# Patient Record
Sex: MALE | Race: WHITE | NOT HISPANIC OR LATINO | URBAN - METROPOLITAN AREA
[De-identification: names, ages, dates, MRNs, and addresses within clinical notes are randomized per-mention and may not be internally consistent; named-entity substitution may affect disease eponyms.]

---

## 2021-05-28 ENCOUNTER — OFFICE VISIT (OUTPATIENT)
Dept: URBAN - METROPOLITAN AREA CLINIC 78 | Facility: CLINIC | Age: 66
End: 2021-05-28
Payer: MEDICARE

## 2021-05-28 ENCOUNTER — LAB OUTSIDE AN ENCOUNTER (OUTPATIENT)
Dept: URBAN - METROPOLITAN AREA CLINIC 78 | Facility: CLINIC | Age: 66
End: 2021-05-28

## 2021-05-28 VITALS
BODY MASS INDEX: 23.13 KG/M2 | HEIGHT: 69 IN | DIASTOLIC BLOOD PRESSURE: 78 MMHG | WEIGHT: 156.2 LBS | SYSTOLIC BLOOD PRESSURE: 163 MMHG | TEMPERATURE: 98 F | HEART RATE: 80 BPM

## 2021-05-28 DIAGNOSIS — R13.10 ESOPHAGEAL DYSPHAGIA: ICD-10-CM

## 2021-05-28 DIAGNOSIS — R11.0 NAUSEA: ICD-10-CM

## 2021-05-28 DIAGNOSIS — R10.84 GENERALIZED ABDOMINAL PAIN: ICD-10-CM

## 2021-05-28 DIAGNOSIS — R63.4 WEIGHT LOSS: ICD-10-CM

## 2021-05-28 PROCEDURE — G9903 PT SCRN TBCO ID AS NON USER: HCPCS | Performed by: INTERNAL MEDICINE

## 2021-05-28 PROCEDURE — 3017F COLORECTAL CA SCREEN DOC REV: CPT | Performed by: INTERNAL MEDICINE

## 2021-05-28 PROCEDURE — G9622 NO UNHEAL ETOH USER: HCPCS | Performed by: INTERNAL MEDICINE

## 2021-05-28 PROCEDURE — 99214 OFFICE O/P EST MOD 30 MIN: CPT | Performed by: INTERNAL MEDICINE

## 2021-05-28 PROCEDURE — G8427 DOCREV CUR MEDS BY ELIG CLIN: HCPCS | Performed by: INTERNAL MEDICINE

## 2021-05-28 PROCEDURE — G8420 CALC BMI NORM PARAMETERS: HCPCS | Performed by: INTERNAL MEDICINE

## 2021-05-28 PROCEDURE — 1036F TOBACCO NON-USER: CPT | Performed by: INTERNAL MEDICINE

## 2021-05-28 RX ORDER — OMEPRAZOLE 20 MG/1
TABLET, DELAYED RELEASE ORAL
Qty: 0 | Refills: 0 | Status: ACTIVE | COMMUNITY
Start: 1900-01-01

## 2021-05-28 RX ORDER — METHOCARBAMOL 500 MG/1
1.5 TABLETS TABLET ORAL
Status: ACTIVE | COMMUNITY

## 2021-05-28 RX ORDER — ALLOPURINOL 100 MG/1
TAKE 1 TABLET (100 MG) BY ORAL ROUTE ONCE DAILY TABLET ORAL 1
Qty: 0 | Refills: 0 | Status: ACTIVE | COMMUNITY
Start: 1900-01-01

## 2021-05-28 RX ORDER — LOVASTATIN 10 MG/1
TABLET ORAL
Qty: 0 | Refills: 0 | Status: ON HOLD | COMMUNITY
Start: 1900-01-01

## 2021-05-28 RX ORDER — SUCRALFATE 1 G/1
1 TABLET ON AN EMPTY STOMACH TABLET ORAL TWICE A DAY
Status: ACTIVE | COMMUNITY

## 2021-05-28 RX ORDER — NAPROXEN 500 MG/1
TABLET ORAL
Qty: 0 | Refills: 0 | Status: ACTIVE | COMMUNITY
Start: 1900-01-01

## 2021-05-28 RX ORDER — FLUOCINOLONE ACETONIDE 0.11 MG/ML
AS DIRECTED OIL AURICULAR (OTIC) AS NEEDED
Status: ACTIVE | COMMUNITY

## 2021-05-28 RX ORDER — IPRATROPIUM BROMIDE 21 UG/1
2 SPRAYS IN EACH NOSTRIL SPRAY NASAL TWICE A DAY
Status: ACTIVE | COMMUNITY

## 2021-05-28 NOTE — PHYSICAL EXAM HENT:
Head- normocephalic, atraumatic, Face- Face within normal limits, Ears- External ears within normal limits, Nose/Nasopharynx- External nose normal appearance, nares patent, no nasal discharge, Mouth and Throat- Oral cavity appearance normal, Lips- Appearance normal

## 2021-05-28 NOTE — PREVIOUS WORKUP REVIEWED
.  ENDOSCOPIES -Colonoscopy 3/18/2019: Normal.  Repeat colonoscopy in 10 years.  LABS  -Labs 4/29/2021:WBC 7.2, hemoglobin 15.3, platelet 178, UA negative for blood, creatinine 0.9, BUN 18, total bilirubin 0.4, alkaline phosphatase 62, AST 23, ALT 35, lipase 50.  IMAGES  -CT abdomen pelvis with contrast 4/29/2021:Diffuse fatty liver.  Small calcified stones within the gallbladder.  Normal bile ducts.  Normal pancreas.  Negative for bowel obstruction.  A few sigmoid diverticulosis.  No findings of diverticulitis.  Normal TI and appendix.  Status post hernia repair we did the left lower abdomen and left pelvis.  Small fatty left inguinal hernia.  No evidence of straining.

## 2021-05-28 NOTE — HPI-TODAY'S VISIT:
This 60-year-old  male presents for chronic left-sided abdominal/flank/back pain.  He went to ER on April 29, [facility] for acute worsening pain in the left lower quadrant area.  Labs and CT scans were done unremarkable.  Sucralfate was prescribed, helped. He reports he had significant constipation before the hospitalist visit.  His bowel movements are regular, 1-2 bowel most per day.  [place] Stool Scale 3, 5, 6. Colonoscopy 2019 normal. Feeling nausea constantly, 10 pounds of weight loss.  He has diabetes, GERD on omeprazole. No previous EGD. He also reports frequent gurgling sound from his belly. He has significant musculoskeletal disorders, spine problems including herniated disks, gout.  Takes naproxen daily. Denies melena, bloody stool

## 2021-06-14 PROBLEM — 40890009: Status: ACTIVE | Noted: 2021-06-14

## 2021-06-16 ENCOUNTER — OFFICE VISIT (OUTPATIENT)
Dept: URBAN - METROPOLITAN AREA CLINIC 19 | Facility: CLINIC | Age: 66
End: 2021-06-16

## 2021-06-21 ENCOUNTER — TELEPHONE ENCOUNTER (OUTPATIENT)
Dept: URBAN - METROPOLITAN AREA CLINIC 78 | Facility: CLINIC | Age: 66
End: 2021-06-21

## 2021-06-25 ENCOUNTER — TELEPHONE ENCOUNTER (OUTPATIENT)
Dept: URBAN - METROPOLITAN AREA CLINIC 77 | Facility: CLINIC | Age: 66
End: 2021-06-25

## 2021-06-25 ENCOUNTER — OFFICE VISIT (OUTPATIENT)
Dept: URBAN - METROPOLITAN AREA MEDICAL CENTER 27 | Facility: MEDICAL CENTER | Age: 66
End: 2021-06-25
Payer: MEDICARE

## 2021-06-25 ENCOUNTER — TELEPHONE ENCOUNTER (OUTPATIENT)
Dept: URBAN - METROPOLITAN AREA CLINIC 78 | Facility: CLINIC | Age: 66
End: 2021-06-25

## 2021-06-25 DIAGNOSIS — K31.5 DUODENAL STENOSIS: ICD-10-CM

## 2021-06-25 DIAGNOSIS — K22.8 COLUMNAR-LINED ESOPHAGUS: ICD-10-CM

## 2021-06-25 DIAGNOSIS — K31.89 ACQUIRED DEFORMITY OF DUODENUM: ICD-10-CM

## 2021-06-25 DIAGNOSIS — K22.4 ESOPHAGEAL MOTILITY DISORDER: ICD-10-CM

## 2021-06-25 PROCEDURE — 43239 EGD BIOPSY SINGLE/MULTIPLE: CPT | Performed by: INTERNAL MEDICINE

## 2021-06-25 RX ORDER — LOVASTATIN 10 MG/1
TABLET ORAL
Qty: 0 | Refills: 0 | Status: ON HOLD | COMMUNITY
Start: 1900-01-01

## 2021-06-25 RX ORDER — OMEPRAZOLE 20 MG/1
TABLET, DELAYED RELEASE ORAL
Qty: 0 | Refills: 0 | Status: ACTIVE | COMMUNITY
Start: 1900-01-01

## 2021-06-25 RX ORDER — ALLOPURINOL 100 MG/1
TAKE 1 TABLET (100 MG) BY ORAL ROUTE ONCE DAILY TABLET ORAL 1
Qty: 0 | Refills: 0 | Status: ACTIVE | COMMUNITY
Start: 1900-01-01

## 2021-06-25 RX ORDER — SUCRALFATE 1 G/1
1 TABLET ON AN EMPTY STOMACH TABLET ORAL TWICE A DAY
Status: ACTIVE | COMMUNITY

## 2021-06-25 RX ORDER — IPRATROPIUM BROMIDE 21 UG/1
2 SPRAYS IN EACH NOSTRIL SPRAY NASAL TWICE A DAY
Status: ACTIVE | COMMUNITY

## 2021-06-25 RX ORDER — NAPROXEN 500 MG/1
TABLET ORAL
Qty: 0 | Refills: 0 | Status: ACTIVE | COMMUNITY
Start: 1900-01-01

## 2021-06-25 RX ORDER — FLUOCINOLONE ACETONIDE 0.11 MG/ML
AS DIRECTED OIL AURICULAR (OTIC) AS NEEDED
Status: ACTIVE | COMMUNITY

## 2021-06-25 RX ORDER — METHOCARBAMOL 500 MG/1
1.5 TABLETS TABLET ORAL
Status: ACTIVE | COMMUNITY

## 2021-06-28 ENCOUNTER — TELEPHONE ENCOUNTER (OUTPATIENT)
Dept: URBAN - METROPOLITAN AREA CLINIC 77 | Facility: CLINIC | Age: 66
End: 2021-06-28

## 2021-07-26 ENCOUNTER — OFFICE VISIT (OUTPATIENT)
Dept: URBAN - METROPOLITAN AREA CLINIC 23 | Facility: CLINIC | Age: 66
End: 2021-07-26

## 2021-07-26 ENCOUNTER — TELEPHONE ENCOUNTER (OUTPATIENT)
Dept: URBAN - METROPOLITAN AREA CLINIC 78 | Facility: CLINIC | Age: 66
End: 2021-07-26

## 2022-01-07 ENCOUNTER — OFFICE VISIT (OUTPATIENT)
Dept: URBAN - METROPOLITAN AREA CLINIC 19 | Facility: CLINIC | Age: 67
End: 2022-01-07
Payer: MEDICARE

## 2022-01-07 ENCOUNTER — WEB ENCOUNTER (OUTPATIENT)
Dept: URBAN - METROPOLITAN AREA CLINIC 19 | Facility: CLINIC | Age: 67
End: 2022-01-07

## 2022-01-07 DIAGNOSIS — Z87.19 HISTORY OF DUODENAL ULCER: ICD-10-CM

## 2022-01-07 DIAGNOSIS — K59.01 CONSTIPATION: ICD-10-CM

## 2022-01-07 DIAGNOSIS — I25.2 HISTORY OF MYOCARDIAL INFARCTION: ICD-10-CM

## 2022-01-07 DIAGNOSIS — N40.0 ENLARGED PROSTATE: ICD-10-CM

## 2022-01-07 DIAGNOSIS — R39.11 URINARY HESITANCY: ICD-10-CM

## 2022-01-07 DIAGNOSIS — K31.5 DUODENAL STRICTURE: ICD-10-CM

## 2022-01-07 DIAGNOSIS — K80.20 GALLSTONES: ICD-10-CM

## 2022-01-07 PROBLEM — 249607009 ENLARGED PROSTATE: Status: ACTIVE | Noted: 2022-01-07

## 2022-01-07 PROBLEM — 95532008 OBSTRUCTION OF DUODENUM: Status: ACTIVE | Noted: 2022-01-07

## 2022-01-07 PROBLEM — 235919008 GALLSTONES: Status: ACTIVE | Noted: 2022-01-07

## 2022-01-07 PROBLEM — 14760008 CONSTIPATION: Status: ACTIVE | Noted: 2022-01-07

## 2022-01-07 PROBLEM — 5972002 URINARY HESITANCY: Status: ACTIVE | Noted: 2022-01-07

## 2022-01-07 PROBLEM — 399211009 HISTORY OF MYOCARDIAL INFARCTION: Status: ACTIVE | Noted: 2022-01-07

## 2022-01-07 PROCEDURE — 99214 OFFICE O/P EST MOD 30 MIN: CPT | Performed by: INTERNAL MEDICINE

## 2022-01-07 RX ORDER — ALLOPURINOL 100 MG/1
TAKE 1 TABLET (100 MG) BY ORAL ROUTE ONCE DAILY TABLET ORAL 1
Qty: 0 | Refills: 0 | Status: ACTIVE | COMMUNITY
Start: 1900-01-01

## 2022-01-07 RX ORDER — NAPROXEN 500 MG/1
TABLET ORAL
Qty: 0 | Refills: 0 | Status: ACTIVE | COMMUNITY
Start: 1900-01-01

## 2022-01-07 RX ORDER — OMEPRAZOLE 20 MG/1
TABLET, DELAYED RELEASE ORAL
Qty: 0 | Refills: 0 | Status: ACTIVE | COMMUNITY
Start: 1900-01-01

## 2022-01-07 RX ORDER — METHOCARBAMOL 500 MG/1
1.5 TABLETS TABLET ORAL
Status: ACTIVE | COMMUNITY

## 2022-01-07 RX ORDER — FLUOCINOLONE ACETONIDE 0.11 MG/ML
AS DIRECTED OIL AURICULAR (OTIC) AS NEEDED
Status: ACTIVE | COMMUNITY

## 2022-01-07 RX ORDER — IPRATROPIUM BROMIDE 21 UG/1
2 SPRAYS IN EACH NOSTRIL SPRAY NASAL TWICE A DAY
Status: ACTIVE | COMMUNITY

## 2022-01-07 RX ORDER — LOVASTATIN 10 MG/1
TABLET ORAL
Qty: 0 | Refills: 0 | Status: ON HOLD | COMMUNITY
Start: 1900-01-01

## 2022-01-07 RX ORDER — SUCRALFATE 1 G/1
1 TABLET ON AN EMPTY STOMACH TABLET ORAL TWICE A DAY
Status: ACTIVE | COMMUNITY

## 2022-01-07 NOTE — HPI-TODAY'S VISIT:
5/28/21 (Dr. Kane Reid):  This 60-year-old  male presents for chronic left-sided abdominal/flank/back pain.  He went to ER on April 29, [facility] for acute worsening pain in the left lower quadrant area.  Labs and CT scans were done unremarkable.  Sucralfate was prescribed, helped.  He reports he had significant constipation before the hospitalist visit.  His bowel movements are regular, 1-2 bowel most per day. [place] Stool Scale 3, 5, 6. Colonoscopy 2019 normal. Feeling nausea constantly, 10 pounds of weight loss.  He has diabetes, GERD on omeprazole. No previous EGD.  He also reports frequent gurgling sound from his belly.  He has significant musculoskeletal disorders, spine problems including herniated disks, gout.  Takes naproxen daily. Denies melena, bloody stool.  1/7/22:  On 6/25/21, Dr. Reid performed an EGD at [facility], [place] that showed inflammatory changes and a small stricture in the 2nd portion of the duodenum that he dilated.  He has not had issues related to that since then.  At the end of November 2021, he had a myocardial infarction requiring stents.  At that time, he was having chest pain that has now resolved.  He has noticed that he has a lot of nighttime urination with some initial hesitancy since the MI.  At the same time, he has had difficulty with defecation.  He has taken some OTC laxatives/stool softeners with almost complete relief.  He was wondering if the MI or the medications he now is taking could have this effect on his bowels.  I do not think so.  However, he went to the [facility] ED on 12/31/21 for evaluation, and his CT abdomen/pelvis without contrast did show an enlarged prostate causing indentation at the urinary bladder base - with his symtpoms, this needs to be evaluated.  Incidental gallstones noted - these are not causing his symptoms.

## 2022-09-09 ENCOUNTER — OFFICE VISIT (OUTPATIENT)
Dept: URBAN - METROPOLITAN AREA CLINIC 23 | Facility: CLINIC | Age: 67
End: 2022-09-09
Payer: MEDICARE

## 2022-09-09 ENCOUNTER — DASHBOARD ENCOUNTERS (OUTPATIENT)
Age: 67
End: 2022-09-09

## 2022-09-09 VITALS
BODY MASS INDEX: 22.07 KG/M2 | SYSTOLIC BLOOD PRESSURE: 109 MMHG | DIASTOLIC BLOOD PRESSURE: 64 MMHG | WEIGHT: 149 LBS | TEMPERATURE: 97.3 F | HEIGHT: 69 IN | HEART RATE: 65 BPM

## 2022-09-09 DIAGNOSIS — K58.1 IRRITABLE BOWEL SYNDROME WITH CONSTIPATION: ICD-10-CM

## 2022-09-09 DIAGNOSIS — R63.4 WEIGHT LOSS: ICD-10-CM

## 2022-09-09 PROBLEM — 440630006: Status: ACTIVE | Noted: 2022-09-09

## 2022-09-09 PROCEDURE — 99214 OFFICE O/P EST MOD 30 MIN: CPT | Performed by: INTERNAL MEDICINE

## 2022-09-09 RX ORDER — ALLOPURINOL 100 MG/1
TAKE 1 TABLET (100 MG) BY ORAL ROUTE ONCE DAILY TABLET ORAL 1
Qty: 0 | Refills: 0 | Status: ACTIVE | COMMUNITY
Start: 1900-01-01

## 2022-09-09 RX ORDER — KETOCONAZOLE 20 MG/ML
AS DIRECTED SHAMPOO, SUSPENSION TOPICAL
Status: ACTIVE | COMMUNITY

## 2022-09-09 RX ORDER — TAMSULOSIN HYDROCHLORIDE 0.4 MG/1
1 CAPSULE CAPSULE ORAL ONCE A DAY
Status: ACTIVE | COMMUNITY

## 2022-09-09 RX ORDER — IPRATROPIUM BROMIDE 21 UG/1
2 SPRAYS IN EACH NOSTRIL SPRAY NASAL TWICE A DAY
Status: ACTIVE | COMMUNITY

## 2022-09-09 RX ORDER — METHOCARBAMOL 500 MG/1
1.5 TABLETS TABLET ORAL
Status: ACTIVE | COMMUNITY

## 2022-09-09 RX ORDER — KRILL/OM-3/DHA/EPA/PHOSPHO/AST 1000-230MG
1 TABLET CAPSULE ORAL ONCE A DAY
Status: ACTIVE | COMMUNITY

## 2022-09-09 RX ORDER — ICOSAPENT ETHYL 1 G/1
2 CAPSULES WITH MEALS CAPSULE ORAL TWICE A DAY
Status: ACTIVE | COMMUNITY

## 2022-09-09 RX ORDER — ATORVASTATIN CALCIUM 80 MG/1
1 TABLET TABLET, FILM COATED ORAL ONCE A DAY
Status: ACTIVE | COMMUNITY

## 2022-09-09 RX ORDER — NITROGLYCERIN 0.4 MG/1
AS DIRECTED TABLET SUBLINGUAL
Status: ACTIVE | COMMUNITY

## 2022-09-09 RX ORDER — PRASUGREL 10 MG/1
AS DIRECTED TABLET, FILM COATED ORAL
Status: ACTIVE | COMMUNITY

## 2022-09-09 NOTE — HPI-TODAY'S VISIT:
67-year-old male presents for left lower quadrant abdominal pain, chronic, and constipation.  He has gotten worse ever since his heart attack in 2021.  2 stents placed.  He has a following Dr. Topete at [facility].  He takes baby aspirin and prasugrel. He moves bowel small every day or every other day.  Excessive straining in having hard stools.  He has been taking Metamucil 1 tablespoon daily with a stool softener OTC. He also reports significant weight loss since her heart attack. His father  of cancer in the abdomen, unknown origin.  age 90s. He had EGD done, showed NSAID induced gastroenteropathy.  Stricture in the duodenum, dilated using balloon. He is completely off NSAIDs including naproxen. Is getting nerve block procedure to the left side thoracic spine soon. Stopped PPI by PCP's recommendation.

## 2022-09-09 NOTE — PREVIOUS WORKUP REVIEWED
.ENDOSCOPIES-EGD 6/25/2021: Abnormal motility in the esophagus.  Extra peristaltic waves in the esophageal body, tertiary personality waves.  Normal stomach.  Acquired benign-appearing intrinsic moderate stenosis in the second portion of the duodenum.  It was not traversed.  Dilation done.*Pathology: Duodenum-normal.  Gastric random-mild chronic inflammation, negative for H. pylori.  Distal esophagus-reflux changes.  Proximal esophagus-reflux changes.-Colonoscopy 3/18/2019: Normal. Repeat colonoscopy in 10 years.LABS-Labs 4/29/2021:WBC 7.2, hemoglobin 15.3, platelet 178, UA negative for blood, creatinine 0.9, BUN 18, total bilirubin 0.4, alkaline phosphatase 62, AST 23, ALT 35, lipase 50.IMAGES -CT abdomen pelvis with contrast 4/29/2021:Diffuse fatty liver. Small calcified stones within the gallbladder. Normal bile ducts. Normal pancreas. Negative for bowel obstruction. A few sigmoid diverticulosis. No findings of diverticulitis. Normal TI and appendix. Status post hernia repair we did the left lower abdomen and left pelvis. Small fatty left inguinal hernia. No evidence of straining.

## 2022-09-12 ENCOUNTER — TELEPHONE ENCOUNTER (OUTPATIENT)
Dept: URBAN - METROPOLITAN AREA CLINIC 23 | Facility: CLINIC | Age: 67
End: 2022-09-12

## 2022-09-18 ENCOUNTER — OUT OF OFFICE VISIT (OUTPATIENT)
Dept: URBAN - METROPOLITAN AREA MEDICAL CENTER 25 | Facility: MEDICAL CENTER | Age: 67
End: 2022-09-18
Payer: MEDICARE

## 2022-09-18 DIAGNOSIS — K92.1 ACUTE MELENA: ICD-10-CM

## 2022-09-18 DIAGNOSIS — D64.89 ANEMIA DUE TO OTHER CAUSE: ICD-10-CM

## 2022-09-18 DIAGNOSIS — Z80.0 BROTHER AT YOUNG AGE FAMILY HISTORY OF COLON CANCER: ICD-10-CM

## 2022-09-18 PROCEDURE — 99223 1ST HOSP IP/OBS HIGH 75: CPT | Performed by: INTERNAL MEDICINE

## 2022-09-18 PROCEDURE — G8427 DOCREV CUR MEDS BY ELIG CLIN: HCPCS | Performed by: INTERNAL MEDICINE

## 2022-09-19 ENCOUNTER — OUT OF OFFICE VISIT (OUTPATIENT)
Dept: URBAN - METROPOLITAN AREA MEDICAL CENTER 25 | Facility: MEDICAL CENTER | Age: 67
End: 2022-09-19
Payer: MEDICARE

## 2022-09-19 DIAGNOSIS — K92.1 ACUTE MELENA: ICD-10-CM

## 2022-09-19 DIAGNOSIS — D64.89 ANEMIA DUE TO OTHER CAUSE: ICD-10-CM

## 2022-09-19 DIAGNOSIS — I48.91 A-FIB: ICD-10-CM

## 2022-09-19 PROCEDURE — 99232 SBSQ HOSP IP/OBS MODERATE 35: CPT | Performed by: PHYSICIAN ASSISTANT

## 2022-09-23 ENCOUNTER — OFFICE VISIT (OUTPATIENT)
Dept: URBAN - METROPOLITAN AREA MEDICAL CENTER 25 | Facility: MEDICAL CENTER | Age: 67
End: 2022-09-23
Payer: MEDICARE

## 2022-09-23 DIAGNOSIS — K92.1 ACUTE MELENA: ICD-10-CM

## 2022-09-23 DIAGNOSIS — K31.5 DUODENAL OBSTRUCTION: ICD-10-CM

## 2022-09-23 PROCEDURE — 43235 EGD DIAGNOSTIC BRUSH WASH: CPT | Performed by: INTERNAL MEDICINE

## 2022-09-23 RX ORDER — METHOCARBAMOL 500 MG/1
1.5 TABLETS TABLET ORAL
Status: ACTIVE | COMMUNITY

## 2022-09-23 RX ORDER — IPRATROPIUM BROMIDE 21 UG/1
2 SPRAYS IN EACH NOSTRIL SPRAY NASAL TWICE A DAY
Status: ACTIVE | COMMUNITY

## 2022-09-23 RX ORDER — ALLOPURINOL 100 MG/1
TAKE 1 TABLET (100 MG) BY ORAL ROUTE ONCE DAILY TABLET ORAL 1
Qty: 0 | Refills: 0 | Status: ACTIVE | COMMUNITY
Start: 1900-01-01

## 2022-09-23 RX ORDER — ATORVASTATIN CALCIUM 80 MG/1
1 TABLET TABLET, FILM COATED ORAL ONCE A DAY
Status: ACTIVE | COMMUNITY

## 2022-09-23 RX ORDER — NITROGLYCERIN 0.4 MG/1
AS DIRECTED TABLET SUBLINGUAL
Status: ACTIVE | COMMUNITY

## 2022-09-23 RX ORDER — KETOCONAZOLE 20 MG/ML
AS DIRECTED SHAMPOO, SUSPENSION TOPICAL
Status: ACTIVE | COMMUNITY

## 2022-09-23 RX ORDER — TAMSULOSIN HYDROCHLORIDE 0.4 MG/1
1 CAPSULE CAPSULE ORAL ONCE A DAY
Status: ACTIVE | COMMUNITY

## 2022-09-23 RX ORDER — PRASUGREL 10 MG/1
AS DIRECTED TABLET, FILM COATED ORAL
Status: ACTIVE | COMMUNITY

## 2022-09-23 RX ORDER — ICOSAPENT ETHYL 1 G/1
2 CAPSULES WITH MEALS CAPSULE ORAL TWICE A DAY
Status: ACTIVE | COMMUNITY

## 2022-09-23 RX ORDER — KRILL/OM-3/DHA/EPA/PHOSPHO/AST 1000-230MG
1 TABLET CAPSULE ORAL ONCE A DAY
Status: ACTIVE | COMMUNITY

## 2022-12-05 ENCOUNTER — OFFICE VISIT (OUTPATIENT)
Dept: URBAN - METROPOLITAN AREA CLINIC 19 | Facility: CLINIC | Age: 67
End: 2022-12-05